# Patient Record
Sex: FEMALE
[De-identification: names, ages, dates, MRNs, and addresses within clinical notes are randomized per-mention and may not be internally consistent; named-entity substitution may affect disease eponyms.]

---

## 2020-11-18 PROBLEM — Z00.00 ENCOUNTER FOR PREVENTIVE HEALTH EXAMINATION: Status: ACTIVE | Noted: 2020-11-18

## 2020-11-19 DIAGNOSIS — R11.0 NAUSEA: ICD-10-CM

## 2020-11-19 DIAGNOSIS — Z78.9 OTHER SPECIFIED HEALTH STATUS: ICD-10-CM

## 2020-11-19 RX ORDER — ELASTIC BANDAGE 2"X2.2YD
BANDAGE TOPICAL
Refills: 0 | Status: ACTIVE | COMMUNITY

## 2020-11-19 RX ORDER — FAMOTIDINE 10 MG/1
10 TABLET, FILM COATED ORAL DAILY
Refills: 0 | Status: ACTIVE | COMMUNITY

## 2020-11-24 ENCOUNTER — APPOINTMENT (OUTPATIENT)
Dept: CARDIOLOGY | Facility: CLINIC | Age: 30
End: 2020-11-24
Payer: COMMERCIAL

## 2020-11-24 VITALS
BODY MASS INDEX: 35.5 KG/M2 | OXYGEN SATURATION: 99 % | DIASTOLIC BLOOD PRESSURE: 79 MMHG | RESPIRATION RATE: 16 BRPM | SYSTOLIC BLOOD PRESSURE: 115 MMHG | TEMPERATURE: 98.1 F | WEIGHT: 188 LBS | HEIGHT: 61 IN | HEART RATE: 79 BPM

## 2020-11-24 DIAGNOSIS — R07.89 OTHER CHEST PAIN: ICD-10-CM

## 2020-11-24 DIAGNOSIS — I00 RHEUMATIC FEVER W/OUT HEART INVOLVEMENT: ICD-10-CM

## 2020-11-24 DIAGNOSIS — R00.2 PALPITATIONS: ICD-10-CM

## 2020-11-24 DIAGNOSIS — R06.02 SHORTNESS OF BREATH: ICD-10-CM

## 2020-11-24 DIAGNOSIS — Z82.3 FAMILY HISTORY OF STROKE: ICD-10-CM

## 2020-11-24 DIAGNOSIS — Z82.49 FAMILY HISTORY OF ISCHEMIC HEART DISEASE AND OTHER DISEASES OF THE CIRCULATORY SYSTEM: ICD-10-CM

## 2020-11-24 PROCEDURE — 93000 ELECTROCARDIOGRAM COMPLETE: CPT

## 2020-11-24 PROCEDURE — 99203 OFFICE O/P NEW LOW 30 MIN: CPT

## 2020-11-24 RX ORDER — DOCUSATE SODIUM 100 MG/1
100 CAPSULE ORAL DAILY
Refills: 0 | Status: ACTIVE | COMMUNITY

## 2020-11-24 NOTE — PHYSICAL EXAM
[General Appearance - Well Developed] : well developed [Normal Appearance] : normal appearance [Well Groomed] : well groomed [General Appearance - Well Nourished] : well nourished [No Deformities] : no deformities [General Appearance - In No Acute Distress] : no acute distress [Normal Conjunctiva] : the conjunctiva exhibited no abnormalities [Eyelids - No Xanthelasma] : the eyelids demonstrated no xanthelasmas [Normal Oral Mucosa] : normal oral mucosa [No Oral Pallor] : no oral pallor [No Oral Cyanosis] : no oral cyanosis [Normal Jugular Venous A Waves Present] : normal jugular venous A waves present [Normal Jugular Venous V Waves Present] : normal jugular venous V waves present [No Jugular Venous Sow A Waves] : no jugular venous sow A waves [Normal] : normal [Normal Rate] : normal [Rhythm Regular] : regular [Normal S1] : normal S1 [Normal S2] : normal S2 [2+] : left 2+ [Respiration, Rhythm And Depth] : normal respiratory rhythm and effort [Exaggerated Use Of Accessory Muscles For Inspiration] : no accessory muscle use [Auscultation Breath Sounds / Voice Sounds] : lungs were clear to auscultation bilaterally [Abdomen Soft] : soft [Abdomen Tenderness] : non-tender [Abdomen Mass (___ Cm)] : no abdominal mass palpated [Abnormal Walk] : normal gait [Gait - Sufficient For Exercise Testing] : the gait was sufficient for exercise testing [Nail Clubbing] : no clubbing of the fingernails [Cyanosis, Localized] : no localized cyanosis [Petechial Hemorrhages (___cm)] : no petechial hemorrhages [Skin Color & Pigmentation] : normal skin color and pigmentation [] : no rash [No Venous Stasis] : no venous stasis [Skin Lesions] : no skin lesions [No Skin Ulcers] : no skin ulcer [No Xanthoma] : no  xanthoma was observed [Oriented To Time, Place, And Person] : oriented to person, place, and time [Affect] : the affect was normal [Mood] : the mood was normal [No Anxiety] : not feeling anxious

## 2020-11-24 NOTE — HISTORY OF PRESENT ILLNESS
[FreeTextEntry1] : Ms. Joseph is a pleasant 31 yo F currently 15 weeks GA [id number] presents in to establish care in the [facility] . She carries a family history of CVA and MI ( grandparents and no prior personal history. \par She now presents in with c/o SOB and ESCAMILLA for the past 2 weeks especially while doing some work, walking, Additionally reports palpitations not associated with any activity. Palps occurs suddenly lasting for 30 mins and relieved with rest, these are associated with LH and dizziness.

## 2020-11-24 NOTE — DISCUSSION/SUMMARY
[FreeTextEntry1] : In summary, Ms. Joseph is a pleasant 29 yo F currently 15 weeks GA [id number] family history of CVA and MI ( grandparents and no prior personal history. \par She now presents in with c/o SOB and ESCAMILLA for the past 2 weeks presents in with c/o SOB and palpitations associated with activity. Palps occurs suddenly lasting for 30 mins and relieved with rest, these are associated with LH and dizziness. \par \par Recommend Transthoracic echocardiogram to evaluate structural and valvular function. \par \par Follow up prn

## 2020-12-08 ENCOUNTER — TRANSCRIPTION ENCOUNTER (OUTPATIENT)
Age: 30
End: 2020-12-08

## 2020-12-15 ENCOUNTER — APPOINTMENT (OUTPATIENT)
Dept: CV DIAGNOSITCS | Facility: HOSPITAL | Age: 30
End: 2020-12-15
Payer: COMMERCIAL

## 2020-12-15 ENCOUNTER — OUTPATIENT (OUTPATIENT)
Dept: OUTPATIENT SERVICES | Facility: HOSPITAL | Age: 30
LOS: 1 days | End: 2020-12-15

## 2020-12-15 DIAGNOSIS — R06.02 SHORTNESS OF BREATH: ICD-10-CM

## 2020-12-15 PROCEDURE — 93306 TTE W/DOPPLER COMPLETE: CPT | Mod: 26

## 2021-01-13 ENCOUNTER — APPOINTMENT (OUTPATIENT)
Dept: PEDIATRIC CARDIOLOGY | Facility: CLINIC | Age: 31
End: 2021-01-13

## 2021-01-13 ENCOUNTER — OUTPATIENT (OUTPATIENT)
Dept: OUTPATIENT SERVICES | Age: 31
LOS: 1 days | Discharge: HOME | End: 2021-01-13

## 2021-01-26 ENCOUNTER — APPOINTMENT (OUTPATIENT)
Dept: PEDIATRIC CARDIOLOGY | Facility: CLINIC | Age: 31
End: 2021-01-26
Payer: COMMERCIAL

## 2021-01-26 PROCEDURE — 76821 MIDDLE CEREBRAL ARTERY ECHO: CPT

## 2021-01-26 PROCEDURE — 76820 UMBILICAL ARTERY ECHO: CPT

## 2021-01-26 PROCEDURE — 99202 OFFICE O/P NEW SF 15 MIN: CPT

## 2021-01-26 PROCEDURE — 76825 ECHO EXAM OF FETAL HEART: CPT

## 2021-01-26 PROCEDURE — 93325 DOPPLER ECHO COLOR FLOW MAPG: CPT | Mod: 59

## 2021-01-26 PROCEDURE — 99072 ADDL SUPL MATRL&STAF TM PHE: CPT

## 2021-01-26 PROCEDURE — 76827 ECHO EXAM OF FETAL HEART: CPT

## 2021-03-29 ENCOUNTER — APPOINTMENT (OUTPATIENT)
Dept: NEUROLOGY | Facility: CLINIC | Age: 31
End: 2021-03-29

## 2024-06-14 ENCOUNTER — RX ONLY (RX ONLY)
Age: 34
End: 2024-06-14

## 2024-06-18 ENCOUNTER — OFFICE (OUTPATIENT)
Facility: LOCATION | Age: 34
Setting detail: OPHTHALMOLOGY
End: 2024-06-18
Payer: MEDICAID

## 2024-06-18 DIAGNOSIS — H40.1131: ICD-10-CM

## 2024-06-18 DIAGNOSIS — H04.123: ICD-10-CM

## 2024-06-18 PROCEDURE — 99213 OFFICE O/P EST LOW 20 MIN: CPT | Performed by: OPTOMETRIST
